# Patient Record
Sex: MALE | Race: OTHER | Employment: OTHER | URBAN - METROPOLITAN AREA
[De-identification: names, ages, dates, MRNs, and addresses within clinical notes are randomized per-mention and may not be internally consistent; named-entity substitution may affect disease eponyms.]

---

## 2017-07-07 PROBLEM — IMO0002 ALCOHOL USE DISORDER: Status: ACTIVE | Noted: 2017-07-07

## 2017-07-07 NOTE — ED NOTES
One SmartSafe bag with personal belongings placed in nursing station. Contents include - underwear, shorts, tank top, and hat. Brother has patient's cell phone, ID and shoes. Tag from bag placed on patient's wrist bag. Bag # [id number].

## 2017-07-07 NOTE — ED INITIAL ASSESSMENT (HPI)
Patient presents to ED with c/o alcohol abuse. His last drink was maybe an hour ago. He was sober for approximately 1 month, but has been drinking for a few weeks. He has been drinking continuously for the last 4 days. He denies SI.  He presents with his br

## 2017-07-07 NOTE — ED NOTES
Patient's brother, Francine Lesch, reports patient has history of hospitalizations for alcohol withdrawal. Patient declines that he needs help with alcohol abuse, brother urges patient to seek help. \"I just need to go home and have like 4 drinks.  Then I'll just wea

## 2017-07-07 NOTE — ED PROVIDER NOTES
Patient Seen in: [facility] Emergency Department    History   Patient presents with:  Eval-P (psychiatric)  Alcohol Intoxication (neurologic)    Stated Complaint: ETOH abuse possible SI    HPI    This is a 76-year-old male with past medical history 120  Resp: 24  Temp: 98.9 °F (37.2 °C)  Temp src: Temporal  SpO2: 95 %  O2 Device: None (Room air)    Current:/74   Pulse 122   Temp 98.9 °F (37.2 °C) (Temporal)   Resp 18   Ht 175.3 cm (5' 9\")   Wt 74.8 kg   SpO2 97%   BMI 24.37 kg/m²         Physi for these tests on the individual orders.    DRUG SCREEN 7 W/OUT CONFIRMATION, URINE   RAINBOW DRAW BLUE   RAINBOW DRAW GOLD   RAINBOW DRAW LAVENDER   RAINBOW DRAW LIGHT GREEN       ============================================================  ED Course  --

## 2017-07-08 PROBLEM — K21.9 ESOPHAGEAL REFLUX: Status: ACTIVE | Noted: 2017-07-08

## 2017-07-08 PROBLEM — F11.20 OPIOID USE DISORDER, SEVERE, DEPENDENCE (HCC): Status: ACTIVE | Noted: 2017-07-08

## 2017-07-08 PROBLEM — F10.20 ALCOHOL USE DISORDER, SEVERE, DEPENDENCE (HCC): Status: ACTIVE | Noted: 2017-07-07

## 2017-07-08 PROBLEM — F19.10 POLYSUBSTANCE ABUSE (HCC): Status: ACTIVE | Noted: 2017-07-08

## 2017-07-08 NOTE — ED NOTES
Patient's personal belongings given back to him. Brother remains at bedside. Brother is in agreement to drive patient directly to [facility] for admission.
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
## 2018-12-09 ENCOUNTER — HOSPITAL ENCOUNTER (EMERGENCY)
Age: 43
Discharge: HOME | End: 2018-12-09
Attending: EMERGENCY MEDICINE
Payer: COMMERCIAL

## 2018-12-09 VITALS
OXYGEN SATURATION: 97 % | DIASTOLIC BLOOD PRESSURE: 92 MMHG | SYSTOLIC BLOOD PRESSURE: 129 MMHG | WEIGHT: 170 LBS | HEART RATE: 83 BPM | HEIGHT: 68 IN | TEMPERATURE: 99 F | BODY MASS INDEX: 25.76 KG/M2 | RESPIRATION RATE: 16 BRPM

## 2018-12-09 DIAGNOSIS — L03.90 CELLULITIS, UNSPECIFIED CELLULITIS SITE: Primary | ICD-10-CM

## 2018-12-09 PROCEDURE — 99283 EMERGENCY DEPT VISIT LOW MDM: CPT

## 2018-12-09 RX ORDER — NEOMYCIN SULFATE, POLYMYXIN B SULFATE AND HYDROCORTISONE 10; 3.5; 1 MG/ML; MG/ML; [USP'U]/ML
4 SUSPENSION/ DROPS AURICULAR (OTIC) 4 TIMES DAILY
Qty: 1 BOTTLE | Refills: 0 | Status: SHIPPED | OUTPATIENT
Start: 2018-12-09 | End: 2018-12-30

## 2018-12-09 RX ORDER — CEPHALEXIN 500 MG/1
500 CAPSULE ORAL 4 TIMES DAILY
Qty: 28 CAPSULE | Refills: 0 | Status: SHIPPED | OUTPATIENT
Start: 2018-12-09 | End: 2018-12-16

## 2018-12-09 RX ORDER — ATORVASTATIN CALCIUM 10 MG/1
10 TABLET, FILM COATED ORAL NIGHTLY
COMMUNITY
End: 2019-02-27

## 2018-12-09 NOTE — ED PROVIDER NOTES
Patient Seen in: [facility] Emergency Department In [place]    History   Patient presents with:  Cellulitis (integumentary, infectious)    Stated Complaint: cellulitis    HPI    Patient complains of an area of redness and swelling behind the right ear.   P erythema or bulging. just posterior to the ear pinna is an area of fullness measuring about 1-1/2 cm x 1/2 cm with overlying erythema and mild warmth. There is no fluctuance.   There is also some tenderness just inferior and posterior to this without any

## 2018-12-31 NOTE — ED NOTES
Spoke to [facility] charge nurse   Julio Cesar Cramer. Pt was instructed to go to [facility] ED instead showed up at [place] ER. Pt states he dranked a bottle of vodka today. Pt denies any drug abuse.  Pt was to be medically stabilized at [facility] ED and follow up with ref

## 2018-12-31 NOTE — ED NOTES
No seizure activity noted, skin warm and dry. No tremors noted. Pt denies any medical complaints at this time. He would like detox from alcohol. Pt is unable to provide ua specimen at this time.

## 2018-12-31 NOTE — ED PROVIDER NOTES
Patient Seen in: [facility] Emergency Department In [place]    History   Patient presents with:  Alcohol Intoxication (neurologic)    Stated Complaint: etoh    HPI    26-year-old male presents for help with alcohol withdrawal.  Patient admits to alcohol a appears well-developed and well-nourished. No distress. Patient follows commands and answers questions appropriately but clearly seems intoxicated smells heavily of alcohol   HENT:   Head: Normocephalic and atraumatic. Neck: Normal range of motion.  Nec was given places that he can go to for referral but advised to help with his current withdrawal symptoms that he would need to go to the emergency department. Instead, he opted to drink alcohol and then came to to the [address] [place] [address].   We attempted to g

## 2018-12-31 NOTE — ED INITIAL ASSESSMENT (HPI)
Patient was seen at [facility] earlier today and was discharged due to insurance reason. Pt has been sober for one  Year. (0) independent

## 2018-12-31 NOTE — PROGRESS NOTES
Spoke to to [facility] ER RN Thu and informed RN that pt was triaged at [facility] and was recommended to go to [facility] ER due to high CIWA score (13) and breathalyzer score of 0.019. Ion Fang Psychiatrist okayed this and was okay with Janell EVANGELISTA to drive pt.  Pt and GF denied any

## 2018-12-31 NOTE — ED NOTES
Awake, alert and pleasant. States he wants to apologize if he ws rude earlier. No slurred speech noted. States understanding of plan for follow up as out patient.  Girlfriend coming to pick him up, he feels comfortable going home

## 2019-02-27 ENCOUNTER — HOSPITAL ENCOUNTER (EMERGENCY)
Age: 44
Discharge: HOME | End: 2019-02-27
Attending: EMERGENCY MEDICINE
Payer: COMMERCIAL

## 2019-02-27 ENCOUNTER — APPOINTMENT (OUTPATIENT)
Dept: CT IMAGING | Age: 44
End: 2019-02-27
Attending: EMERGENCY MEDICINE
Payer: COMMERCIAL

## 2019-02-27 VITALS
BODY MASS INDEX: 25.76 KG/M2 | HEIGHT: 68 IN | WEIGHT: 170 LBS | OXYGEN SATURATION: 97 % | RESPIRATION RATE: 16 BRPM | HEART RATE: 78 BPM | SYSTOLIC BLOOD PRESSURE: 115 MMHG | TEMPERATURE: 98 F | DIASTOLIC BLOOD PRESSURE: 70 MMHG

## 2019-02-27 DIAGNOSIS — S09.90XA INJURY OF HEAD, INITIAL ENCOUNTER: Primary | ICD-10-CM

## 2019-02-27 PROCEDURE — 99284 EMERGENCY DEPT VISIT MOD MDM: CPT

## 2019-02-27 PROCEDURE — 70450 CT HEAD/BRAIN W/O DYE: CPT | Performed by: EMERGENCY MEDICINE

## 2019-02-27 RX ORDER — OMEPRAZOLE 20 MG/1
20 CAPSULE, DELAYED RELEASE ORAL
COMMUNITY

## 2019-02-27 RX ORDER — IBUPROFEN 800 MG/1
1200 TABLET ORAL EVERY 6 HOURS PRN
Status: ON HOLD | COMMUNITY
End: 2019-08-07

## 2019-02-27 NOTE — ED PROVIDER NOTES
Patient Seen in: THE [facility] Emergency Department In [place]    History   Patient presents with:  Headache (neurologic)    Stated Complaint: fell off  the stairs- 4 steps yesterday.  hit head on the wall, still feeling di*    HPI    42-year-old male who pre Current:/80   Pulse 83   Temp 98 °F (36.7 °C) (Temporal)   Resp 20   Ht 172.7 cm (5' 8\")   Wt 77.1 kg   SpO2 97%   BMI 25.85 kg/m²         Physical Exam    Well-developed well-nourished male who sitting on the gurney he is awake and alert.  He MDM   Patient appears to have a minor concussion. He will be discharged home to follow-up with his PCP. He is advised to rest.  Take Tylenol Advil for pain. Return if symptoms worsen or new symptoms develop.             Disposition and Plan

## 2019-02-27 NOTE — ED INITIAL ASSESSMENT (HPI)
Pt states yest tripped over shoes and fell down 4 steps head first and possible LOC, one emesis few hrs  Later.  Co HA and nausea, right cheek swelling and pain,

## 2019-03-06 ENCOUNTER — HOSPITAL ENCOUNTER (EMERGENCY)
Age: 44
Discharge: HOME | End: 2019-03-06
Attending: EMERGENCY MEDICINE
Payer: COMMERCIAL

## 2019-03-06 ENCOUNTER — APPOINTMENT (OUTPATIENT)
Dept: CT IMAGING | Age: 44
End: 2019-03-06
Attending: EMERGENCY MEDICINE
Payer: COMMERCIAL

## 2019-03-06 VITALS
HEART RATE: 72 BPM | DIASTOLIC BLOOD PRESSURE: 72 MMHG | HEIGHT: 68 IN | TEMPERATURE: 98 F | RESPIRATION RATE: 16 BRPM | BODY MASS INDEX: 25.76 KG/M2 | WEIGHT: 170 LBS | OXYGEN SATURATION: 97 % | SYSTOLIC BLOOD PRESSURE: 136 MMHG

## 2019-03-06 DIAGNOSIS — R11.2 INTRACTABLE VOMITING WITH NAUSEA, UNSPECIFIED VOMITING TYPE: Primary | ICD-10-CM

## 2019-03-06 LAB
ALBUMIN SERPL-MCNC: 4.3 G/DL (ref 3.4–5)
ALBUMIN/GLOB SERPL: 1 {RATIO} (ref 1–2)
ALP LIVER SERPL-CCNC: 54 U/L (ref 45–117)
ALT SERPL-CCNC: 122 U/L (ref 16–61)
ANION GAP SERPL CALC-SCNC: 13 MMOL/L (ref 0–18)
AST SERPL-CCNC: 98 U/L (ref 15–37)
BASOPHILS # BLD AUTO: 0.03 X10(3) UL (ref 0–0.2)
BASOPHILS NFR BLD AUTO: 0.8 %
BILIRUB SERPL-MCNC: 1.1 MG/DL (ref 0.1–2)
BUN BLD-MCNC: 16 MG/DL (ref 7–18)
BUN/CREAT SERPL: 18.8 (ref 10–20)
CALCIUM BLD-MCNC: 8.6 MG/DL (ref 8.5–10.1)
CHLORIDE SERPL-SCNC: 100 MMOL/L (ref 98–107)
CO2 SERPL-SCNC: 23 MMOL/L (ref 21–32)
CREAT BLD-MCNC: 0.85 MG/DL (ref 0.7–1.3)
DEPRECATED RDW RBC AUTO: 42.5 FL (ref 35.1–46.3)
EOSINOPHIL # BLD AUTO: 0 X10(3) UL (ref 0–0.7)
EOSINOPHIL NFR BLD AUTO: 0 %
ERYTHROCYTE [DISTWIDTH] IN BLOOD BY AUTOMATED COUNT: 12.9 % (ref 11–15)
GLOBULIN PLAS-MCNC: 4.1 G/DL (ref 2.8–4.4)
GLUCOSE BLD-MCNC: 118 MG/DL (ref 70–99)
HCT VFR BLD AUTO: 44.1 % (ref 39–53)
HGB BLD-MCNC: 14.6 G/DL (ref 13–17.5)
IMM GRANULOCYTES # BLD AUTO: 0.01 X10(3) UL (ref 0–1)
IMM GRANULOCYTES NFR BLD: 0.3 %
LYMPHOCYTES # BLD AUTO: 1.55 X10(3) UL (ref 1–4)
LYMPHOCYTES NFR BLD AUTO: 40.2 %
M PROTEIN MFR SERPL ELPH: 8.4 G/DL (ref 6.4–8.2)
MCH RBC QN AUTO: 29.7 PG (ref 26–34)
MCHC RBC AUTO-ENTMCNC: 33.1 G/DL (ref 31–37)
MCV RBC AUTO: 89.8 FL (ref 80–100)
MONOCYTES # BLD AUTO: 0.2 X10(3) UL (ref 0.1–1)
MONOCYTES NFR BLD AUTO: 5.2 %
NEUTROPHILS # BLD AUTO: 2.07 X10 (3) UL (ref 1.5–7.7)
NEUTROPHILS # BLD AUTO: 2.07 X10(3) UL (ref 1.5–7.7)
NEUTROPHILS NFR BLD AUTO: 53.5 %
OSMOLALITY SERPL CALC.SUM OF ELEC: 284 MOSM/KG (ref 275–295)
PLATELET # BLD AUTO: 246 10(3)UL (ref 150–450)
POTASSIUM SERPL-SCNC: 3.7 MMOL/L (ref 3.5–5.1)
RBC # BLD AUTO: 4.91 X10(6)UL (ref 4.3–5.7)
SODIUM SERPL-SCNC: 136 MMOL/L (ref 136–145)
WBC # BLD AUTO: 3.9 X10(3) UL (ref 4–11)

## 2019-03-06 PROCEDURE — 96375 TX/PRO/DX INJ NEW DRUG ADDON: CPT

## 2019-03-06 PROCEDURE — 96374 THER/PROPH/DIAG INJ IV PUSH: CPT

## 2019-03-06 PROCEDURE — 85025 COMPLETE CBC W/AUTO DIFF WBC: CPT | Performed by: EMERGENCY MEDICINE

## 2019-03-06 PROCEDURE — 80053 COMPREHEN METABOLIC PANEL: CPT | Performed by: EMERGENCY MEDICINE

## 2019-03-06 PROCEDURE — 96361 HYDRATE IV INFUSION ADD-ON: CPT

## 2019-03-06 PROCEDURE — 99284 EMERGENCY DEPT VISIT MOD MDM: CPT

## 2019-03-06 PROCEDURE — 70450 CT HEAD/BRAIN W/O DYE: CPT | Performed by: EMERGENCY MEDICINE

## 2019-03-06 RX ORDER — ONDANSETRON 2 MG/ML
4 INJECTION INTRAMUSCULAR; INTRAVENOUS ONCE
Status: COMPLETED | OUTPATIENT
Start: 2019-03-06 | End: 2019-03-06

## 2019-03-06 RX ORDER — METOCLOPRAMIDE HYDROCHLORIDE 5 MG/ML
10 INJECTION INTRAMUSCULAR; INTRAVENOUS ONCE
Status: COMPLETED | OUTPATIENT
Start: 2019-03-06 | End: 2019-03-06

## 2019-03-06 RX ORDER — ONDANSETRON 4 MG/1
4 TABLET, ORALLY DISINTEGRATING ORAL EVERY 4 HOURS PRN
Qty: 10 TABLET | Refills: 0 | Status: SHIPPED | OUTPATIENT
Start: 2019-03-06 | End: 2019-03-13

## 2019-03-06 RX ORDER — DIPHENHYDRAMINE HYDROCHLORIDE 50 MG/ML
25 INJECTION INTRAMUSCULAR; INTRAVENOUS ONCE
Status: COMPLETED | OUTPATIENT
Start: 2019-03-06 | End: 2019-03-06
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
## 2019-03-06 NOTE — ED PROVIDER NOTES
Patient Seen in: THE [facility] Emergency Department In [place]    History   Patient presents with:  Head Neck Injury (neurologic, musculoskeletal)    Stated Complaint: head injury 5 days ago with +loc today feels worse with continued vomiting.      ALVARADO [place] well-nourished. Head: Normocephalic and atraumatic. Nose: Nose normal.   Eyes: EOM are normal. Pupils are equal, round, and reactive to light. Neck: Normal range of motion. Neck supple. No JVD present.    Cardiovascular: Normal rate and regular rhythm reassuring. MDM       It was unclear based on the history provided and exam, whether his symptoms were due to postconcussive issue, an infectious issue or a potential delayed bleed.   I given this I did reimage his head and its negative for acute traum

## 2019-08-01 ENCOUNTER — APPOINTMENT (OUTPATIENT)
Dept: GENERAL RADIOLOGY | Age: 44
End: 2019-08-01
Attending: EMERGENCY MEDICINE
Payer: MEDICAID

## 2019-08-01 PROCEDURE — 71045 X-RAY EXAM CHEST 1 VIEW: CPT | Performed by: EMERGENCY MEDICINE

## 2019-08-01 NOTE — ED NOTES
Called registration to get restriction of rights scanned,but Harris Chauhan stated they WILL NOT scan this for per her supervisor and declined to scan into chart due to this being \"a new form that registration has nothing to do with. \" Patient has received restric

## 2019-08-01 NOTE — ED NOTES
Pt to be transferred to main ED in [place] per Dr Dana Marino. Report called to [facility].   EAS called
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
## 2019-08-01 NOTE — ED PROVIDER NOTES
Patient Seen in: THE [facility] Emergency Department In [place]    History   Patient presents with:  Eval-P (psychiatric)    Stated Complaint: SI and ETOH    HPI    45-year-old male presents for evaluation of alcohol intoxication.   Patient has been drinking v °F (36.9 °C) (Temporal)   Resp 16   Ht 172.7 cm (5' 8\")   Wt 79.4 kg   SpO2 99%   BMI 26.61 kg/m²         Physical Exam    General: Alert, agitated, combative, intoxicated  HEENT: Atraumatic, normocephalic. Pupils equal reactive.   Extraocular motions int haloperidol lactate (HALDOL) 5 MG/ML injection 5 mg (5 mg Intravenous Given 8/1/19 8232)   LORazepam (ATIVAN) 2 MG/ML injection (has no administration in time range)   sodium chloride 0.9% IV bolus 1,000 mL (1,000 mL Intravenous New Bag 8/1/19 1730)   ac

## 2019-08-02 PROBLEM — F10.20 ALCOHOL DEPENDENCE (HCC): Status: ACTIVE | Noted: 2019-08-02

## 2019-08-02 NOTE — ED NOTES
Pt was incontinent of urine. Linens changed, aroused with movement.   Voided 900cc in urinal.  EAS here and report given
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
## 2019-08-02 NOTE — ED NOTES
Marisol from shin at bedside to eval, aware of s/s of withdrawal, md ordering 1 mg of ativan so pt can participatein eval, will continue to monitor, vss

## 2019-08-02 NOTE — BH PROGRESS NOTE
Sneha Miller 84 [address]  and spoke with Particlian Louie in admission/intake. They are unable to take patient for at least 48 hours to monitor for withdrawal symptoms. Explained that patient is already showing signs of withdrawal and they deflected.
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
## 2019-08-02 NOTE — ED NOTES
Patient transferred from PED. Patient drowsy, but arousable. VSS. Warm blanket provided to patient. Dr. Priscilla Ware at bedside.

## 2019-08-02 NOTE — ED PROVIDER NOTES
Patient evaluated by [facility] when sober and after discussions with the patient's mother are in agreement that his actions were suicidal.  Patient did have some mild withdrawal symptoms.   He was given 1 mg of Ativan and this almost completely dissipated his sym

## 2019-08-02 NOTE — ED NOTES
Received pt, sleeping, easily arrousable, vss, calm, cooperative no distress noted, shin to eval around 10a when etoh level is wnl

## 2019-08-02 NOTE — ED NOTES
Pt more awake, asked if wanting breakfast, pt appears to be tremoring and diaphoretic, ciwa to be done, pt at this time denies si/hi, not wanting help for acohol withdrawal, seems to be a binge drinker over the years, pt informed of the concerns with ines

## 2019-08-02 NOTE — ED NOTES
ciwa 12, pt appears anxoius once again, mod tremoring, and diaphoretic, pt remaining calm and cooperative, eas here to transport pt

## 2019-08-02 NOTE — ED PROVIDER NOTES
Signed out by previous physician. assessment pending sobriety. vital signs have been stable. Has not had any medical complaints. Resting comfortably in the bed.

## 2019-08-06 PROBLEM — F31.5 BIPOLAR I DISORDER, MOST RECENT EPISODE (OR CURRENT) DEPRESSED, SEVERE, SPECIFIED AS WITH PSYCHOTIC BEHAVIOR (HCC): Status: ACTIVE | Noted: 2019-08-06

## 2021-05-02 ENCOUNTER — HOSPITAL ENCOUNTER (EMERGENCY)
Age: 46
Discharge: HOME | End: 2021-05-02
Attending: EMERGENCY MEDICINE
Payer: MEDICAID

## 2021-05-02 ENCOUNTER — APPOINTMENT (OUTPATIENT)
Dept: CT IMAGING | Age: 46
End: 2021-05-02
Attending: EMERGENCY MEDICINE
Payer: MEDICAID

## 2021-05-02 ENCOUNTER — APPOINTMENT (OUTPATIENT)
Dept: GENERAL RADIOLOGY | Age: 46
End: 2021-05-02
Attending: EMERGENCY MEDICINE
Payer: MEDICAID

## 2021-05-02 VITALS
WEIGHT: 180 LBS | RESPIRATION RATE: 16 BRPM | SYSTOLIC BLOOD PRESSURE: 127 MMHG | BODY MASS INDEX: 27 KG/M2 | DIASTOLIC BLOOD PRESSURE: 68 MMHG | OXYGEN SATURATION: 94 % | TEMPERATURE: 98 F | HEART RATE: 66 BPM

## 2021-05-02 DIAGNOSIS — S20.212A RIB CONTUSION, LEFT, INITIAL ENCOUNTER: ICD-10-CM

## 2021-05-02 DIAGNOSIS — M51.36 DEGENERATIVE DISC DISEASE, LUMBAR: ICD-10-CM
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
## 2021-05-02 PROCEDURE — 80053 COMPREHEN METABOLIC PANEL: CPT | Performed by: EMERGENCY MEDICINE

## 2021-05-02 PROCEDURE — 74177 CT ABD & PELVIS W/CONTRAST: CPT | Performed by: EMERGENCY MEDICINE

## 2021-05-02 PROCEDURE — 96374 THER/PROPH/DIAG INJ IV PUSH: CPT | Performed by: EMERGENCY MEDICINE

## 2021-05-02 PROCEDURE — 96376 TX/PRO/DX INJ SAME DRUG ADON: CPT | Performed by: EMERGENCY MEDICINE

## 2021-05-02 PROCEDURE — 70450 CT HEAD/BRAIN W/O DYE: CPT | Performed by: EMERGENCY MEDICINE

## 2021-05-02 PROCEDURE — 99284 EMERGENCY DEPT VISIT MOD MDM: CPT | Performed by: EMERGENCY MEDICINE

## 2021-05-02 PROCEDURE — 82077 ASSAY SPEC XCP UR&BREATH IA: CPT | Performed by: EMERGENCY MEDICINE

## 2021-05-02 PROCEDURE — 96361 HYDRATE IV INFUSION ADD-ON: CPT | Performed by: EMERGENCY MEDICINE

## 2021-05-02 PROCEDURE — 73030 X-RAY EXAM OF SHOULDER: CPT | Performed by: EMERGENCY MEDICINE

## 2021-05-02 PROCEDURE — 72125 CT NECK SPINE W/O DYE: CPT | Performed by: EMERGENCY MEDICINE

## 2021-05-02 PROCEDURE — 71101 X-RAY EXAM UNILAT RIBS/CHEST: CPT | Performed by: EMERGENCY MEDICINE

## 2021-05-02 PROCEDURE — 96375 TX/PRO/DX INJ NEW DRUG ADDON: CPT | Performed by: EMERGENCY MEDICINE

## 2021-05-02 PROCEDURE — 85025 COMPLETE CBC W/AUTO DIFF WBC: CPT | Performed by: EMERGENCY MEDICINE

## 2021-05-02 RX ORDER — SODIUM CHLORIDE 9 MG/ML
INJECTION, SOLUTION INTRAVENOUS ONCE
Status: COMPLETED | OUTPATIENT
Start: 2021-05-02 | End: 2021-05-02

## 2021-05-02 RX ORDER — ONDANSETRON 2 MG/ML
4 INJECTION INTRAMUSCULAR; INTRAVENOUS ONCE
Status: COMPLETED | OUTPATIENT
Start: 2021-05-02 | End: 2021-05-02

## 2021-05-02 RX ORDER — MORPHINE SULFATE 4 MG/ML
4 INJECTION, SOLUTION INTRAMUSCULAR; INTRAVENOUS ONCE
Status: COMPLETED | OUTPATIENT
Start: 2021-05-02 | End: 2021-05-02
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
## 2023-04-10 ENCOUNTER — OFFICE VISIT (OUTPATIENT)
Dept: URBAN - METROPOLITAN AREA CLINIC 30 | Facility: CLINIC | Age: 48
End: 2023-04-10
Payer: COMMERCIAL

## 2023-04-10 DIAGNOSIS — H31.011 MACULA SCAR OF POST POLE OF RIGHT EYE: ICD-10-CM

## 2023-04-10 DIAGNOSIS — S05.91XS UNSPECIFIED INJURY OF RIGHT EYE AND ORBIT, SEQUELA: Primary | ICD-10-CM

## 2023-04-10 DIAGNOSIS — H43.813 VITREOUS DEGENERATION, BILATERAL: ICD-10-CM

## 2023-04-10 PROCEDURE — 92083 EXTENDED VISUAL FIELD XM: CPT

## 2023-04-10 PROCEDURE — 92134 CPTRZ OPH DX IMG PST SGM RTA: CPT

## 2023-04-10 PROCEDURE — 92004 COMPRE OPH EXAM NEW PT 1/>: CPT

## 2023-04-10 ASSESSMENT — KERATOMETRY
OS: 42.45
OD: 42.30

## 2023-04-10 ASSESSMENT — VISUAL ACUITY
OS: 20/25
OD: 20/80

## 2023-04-10 ASSESSMENT — INTRAOCULAR PRESSURE
OD: 16
OS: 16

## 2023-04-10 NOTE — IMPRESSION/PLAN
Impression: Vitreous degeneration, bilateral: H43.813. Plan: Mac OCT: atrophy OD, WNL OS. Retina flat & intact 360, no holes/breaks/tears. S/S of RD discussed, pt to RTC ASAP if occurs. Monitor. Repeat Mac OCT next visit.
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
## 2024-04-11 ENCOUNTER — OFFICE VISIT (OUTPATIENT)
Dept: URBAN - METROPOLITAN AREA CLINIC 30 | Facility: CLINIC | Age: 49
End: 2024-04-11
Payer: COMMERCIAL

## 2024-04-11 DIAGNOSIS — H31.011 MACULA SCAR OF POST POLE OF RIGHT EYE: ICD-10-CM

## 2024-04-11 DIAGNOSIS — H43.813 VITREOUS DEGENERATION, BILATERAL: ICD-10-CM

## 2024-04-11 DIAGNOSIS — S05.91XS UNSPECIFIED INJURY OF RIGHT EYE AND ORBIT, SEQUELA: Primary | ICD-10-CM

## 2024-04-11 PROCEDURE — 92014 COMPRE OPH EXAM EST PT 1/>: CPT

## 2024-04-11 PROCEDURE — 92134 CPTRZ OPH DX IMG PST SGM RTA: CPT

## 2024-04-11 PROCEDURE — 92083 EXTENDED VISUAL FIELD XM: CPT

## 2024-04-11 ASSESSMENT — INTRAOCULAR PRESSURE
OD: 18
OS: 18

## 2024-04-11 ASSESSMENT — VISUAL ACUITY
OD: 20/125
OS: 20/30

## 2024-04-11 ASSESSMENT — KERATOMETRY
OD: 42.17
OS: 42.27

## 2025-02-19 ENCOUNTER — OFFICE VISIT (OUTPATIENT)
Dept: URBAN - METROPOLITAN AREA CLINIC 30 | Facility: CLINIC | Age: 50
End: 2025-02-19
Payer: COMMERCIAL

## 2025-02-19 DIAGNOSIS — H43.813 VITREOUS DEGENERATION, BILATERAL: ICD-10-CM

## 2025-02-19 DIAGNOSIS — H31.011 MACULA SCAR OF POST POLE OF RIGHT EYE: ICD-10-CM

## 2025-02-19 DIAGNOSIS — S05.91XS UNSPECIFIED INJURY OF RIGHT EYE AND ORBIT, SEQUELA: Primary | ICD-10-CM

## 2025-02-19 PROCEDURE — 92134 CPTRZ OPH DX IMG PST SGM RTA: CPT

## 2025-02-19 PROCEDURE — 92083 EXTENDED VISUAL FIELD XM: CPT

## 2025-02-19 PROCEDURE — 99214 OFFICE O/P EST MOD 30 MIN: CPT

## 2025-02-19 ASSESSMENT — INTRAOCULAR PRESSURE
OS: 16
OD: 16

## (undated) NOTE — ED AVS SNAPSHOT
[place] Blood   MRN: [id number]    Department:  THE [facility] Emergency Department in [place]   Date of Visit:  2/27/2019           Disclosure     Insurance plans vary and the physician(s) referred by the ER may not be covered by your plan.  Please contact tell this physician (or your personal doctor if your instructions are to return to your personal doctor) about any new or lasting problems. The primary care or specialist physician will see patients referred from the [facility] Emergency Department.  Elida Elizondo

## (undated) NOTE — ED AVS SNAPSHOT
[facility] Emergency Department  [place]  [address]  [place] [address]  Phone:  [phone]  Fax:  [phone]          Ernestine Clay   MRN: [id number]    Department:  [facility] Emergency Department   Date of Visit:  7/7/2017 CARE PHYSICIAN AT ONCE OR RETURN IMMEDIATELY TO THE EMERGENCY DEPARTMENT.     If you have been prescribed any medication(s), please fill your prescription right away and begin taking the medication(s) as directed    If the emergency physician has read X-ray

## (undated) NOTE — ED AVS SNAPSHOT
Sunny Sin   MRN: [id number]    Department:  [address] Emergency Department in [place]   Date of Visit:  12/9/2018           Disclosure     Insurance plans vary and the physician(s) referred by the ER may not be covered by your plan.  Please contact tell this physician (or your personal doctor if your instructions are to return to your personal doctor) about any new or lasting problems. The primary care or specialist physician will see patients referred from the [facility] Emergency Department.  Janis Camarena

## (undated) NOTE — ED AVS SNAPSHOT
Srinivasan Thurston   MRN: [id number]    Department:  THE [facility] Emergency Department in [place]   Date of Visit:  3/6/2019           Disclosure     Insurance plans vary and the physician(s) referred by the ER may not be covered by your plan.  Please contact tell this physician (or your personal doctor if your instructions are to return to your personal doctor) about any new or lasting problems. The primary care or specialist physician will see patients referred from the [facility] Emergency Department.  Janis Camarena

## (undated) NOTE — ED AVS SNAPSHOT
Phylicia Adan   MRN: [id number]    Department:  THE [facility] Emergency Department in [place]   Date of Visit:  12/30/2018           Disclosure     Insurance plans vary and the physician(s) referred by the ER may not be covered by your plan.  Please contac tell this physician (or your personal doctor if your instructions are to return to your personal doctor) about any new or lasting problems. The primary care or specialist physician will see patients referred from the [facility] Emergency Department.  Suad Pacheco

## (undated) NOTE — LETTER
Date & Time: 3/8/2019, 4:28 PM  Patient: Florina Stauffer  Encounter Provider(s):    Georgian Lefort, MD       To Whom It May Concern:    Florina Stauffer was seen and treated in our department on 3/6/2019. He can return to work.     If you have any questions or

## (undated) NOTE — LETTER
Date & Time: 2/27/2019, 8:50 AM  Patient: Galen Sykes  Encounter Provider(s):    Allegra Hamm MD       To Whom It May Concern:    Galen Sykes was seen and treated in our department on 2/27/2019. He can return to work. 02/28/19    If you have an